# Patient Record
Sex: MALE | Race: WHITE
[De-identification: names, ages, dates, MRNs, and addresses within clinical notes are randomized per-mention and may not be internally consistent; named-entity substitution may affect disease eponyms.]

---

## 2017-02-04 ENCOUNTER — HOSPITAL ENCOUNTER (EMERGENCY)
Dept: HOSPITAL 10 - E/R | Age: 52
LOS: 1 days | Discharge: HOME | End: 2017-02-05
Payer: COMMERCIAL

## 2017-02-04 VITALS
WEIGHT: 162.04 LBS | HEIGHT: 66 IN | BODY MASS INDEX: 26.04 KG/M2 | BODY MASS INDEX: 26.04 KG/M2 | HEIGHT: 66 IN | WEIGHT: 162.04 LBS

## 2017-02-04 DIAGNOSIS — E03.9: ICD-10-CM

## 2017-02-04 DIAGNOSIS — F41.9: Primary | ICD-10-CM

## 2017-02-04 DIAGNOSIS — I10: ICD-10-CM

## 2017-02-04 PROCEDURE — 93005 ELECTROCARDIOGRAM TRACING: CPT

## 2017-02-05 VITALS
SYSTOLIC BLOOD PRESSURE: 133 MMHG | TEMPERATURE: 97.7 F | HEART RATE: 87 BPM | DIASTOLIC BLOOD PRESSURE: 88 MMHG | RESPIRATION RATE: 17 BRPM

## 2017-02-05 NOTE — ERD
ER Documentation


Chief Complaint


Date/Time


DATE: 2/5/17 


TIME: 03:12


Chief Complaint


woke up 30 minutes ago w/ heart palpitation





HPI


This 52-year-old male comes to the emergency room because he woke approximately 

an hour ago and felt heart palpitations.  He also felt like his face was 

flushed and red.  He had eaten dinner and then gone to bed.  He denies any 

chest pain or shortness of breath.  States that his symptoms are completely 

resolved now.  Patient's wife states that he suffered a traumatic event last 

year and since then he has been feeling more stressed out.





ROS


All systems reviewed and are negative except as per history of present illness.





Medications


Home Meds


Active Scripts


Ranitidine Hcl* (Zantac*) 150 Mg Tablet, 150 MG PO BID Y for EPIGASTRIC PAIN, #

30 TAB


   Prov:NENAYANELISNICAGENOVEVA LEY         2/5/17


Reported Medications


[atenolol]   No Conflict Check


   12/5/13


Levothyroxine Sodium* (Levoxyl*) 25 Mcg Tablet, 25 MCG PO DAILY


   12/5/13





Allergies


Allergies:  


Coded Allergies:  


     No Known Allergy (Verified , 12/5/13)





PMhx/Soc


History of Surgery:  Yes (HERNIA REPAIR)


Anesthesia Reaction:  No


Hx Neurological Disorder:  No


Hx Respiratory Disorders:  No


Hx Cardiac Disorders:  Yes (HTN)


Hx Psychiatric Problems:  No


Hx Miscellaneous Medical Probl:  No


Hx Alcohol Use:  No


Hx Substance Use:  No


Hx Tobacco Use:  No





Physical Exam


Vitals





Vital Signs








  Date Time  Temp Pulse Resp B/P Pulse Ox O2 Delivery O2 Flow Rate FiO2


 


2/4/17 23:45 97.7 89 20 150/87 100   








Physical Exam


Const:  []          No distress


Head:   Atraumatic 


Eyes:    Normal Conjunctiva


ENT:    Normal External Ears, Nose and Mouth.


Neck:               Full range of motion..~ No meningismus.


Resp:    Clear to auscultation bilaterally


Cardio:    Regular rate and rhythm, no murmurs


Skin:    No petechiae or rashes


Ext:    No cyanosis, or edema


Neur:    Awake and alert and oriented 3, no focal deficits


Psych:    Normal Mood and Affect


Results 24 hrs





 Current Medications








 Medications


  (Trade)  Dose


 Ordered  Sig/Brenda


 Route


 PRN Reason  Start Time


 Stop Time Status Last Admin


Dose Admin


 


 Alprazolam


  (Xanax)  0.5 mg  ONCE  ONCE


 PO


   2/5/17 03:30


 2/5/17 03:31   


 











Procedures/MDM


Palpitation possibly secondary to anxiety reaction.  Patient was asymptomatic 

prior to examination.  He did never experienced chest pain and has a 

nonischemic EKG.  I am going to give him a small Xanax pills that he can sleep 

through the night.  I am also discharging with instructions to obtain an 

echocardiogram through his primary care doctor which the patient states he has 

good follow-up.  Discharging with Zantac for possible GERD as the patient did 

have symptoms after eating and laying down to sleep.





EKG interpretation: Sinus bradycardia rate of 53, normal axis, no ST or T-wave 

changes concerning for acute ischemia, normal intervals.





Departure


Diagnosis:  


 Primary Impression:  


 Anxiety


 Additional Impression:  


 Palpitations


Condition:  Stable


Patient Instructions:  Anxiety Reaction, Palpitations





Additional Instructions:  


Call your primary care doctor TOMORROW for an appointment during the next 1-2 

days.  Obtain a referral for an ECHOCARDIOGRAM.  See the doctor sooner or 

return here if your condition worsens before your appointment time.











NICA BARRETT DO Feb 5, 2017 03:15

## 2017-09-11 ENCOUNTER — HOSPITAL ENCOUNTER (EMERGENCY)
Dept: HOSPITAL 10 - E/R | Age: 52
LOS: 1 days | Discharge: HOME | End: 2017-09-12
Payer: COMMERCIAL

## 2017-09-11 VITALS
WEIGHT: 155.32 LBS | HEIGHT: 61 IN | BODY MASS INDEX: 29.32 KG/M2 | WEIGHT: 155.32 LBS | HEIGHT: 61 IN | BODY MASS INDEX: 29.32 KG/M2

## 2017-09-11 DIAGNOSIS — K57.92: Primary | ICD-10-CM

## 2017-09-11 DIAGNOSIS — I10: ICD-10-CM

## 2017-09-11 PROCEDURE — 74176 CT ABD & PELVIS W/O CONTRAST: CPT

## 2017-09-11 PROCEDURE — 80053 COMPREHEN METABOLIC PANEL: CPT

## 2017-09-11 PROCEDURE — 83690 ASSAY OF LIPASE: CPT

## 2017-09-11 PROCEDURE — 81003 URINALYSIS AUTO W/O SCOPE: CPT

## 2017-09-11 PROCEDURE — 36415 COLL VENOUS BLD VENIPUNCTURE: CPT

## 2017-09-11 PROCEDURE — 85025 COMPLETE CBC W/AUTO DIFF WBC: CPT

## 2017-09-12 VITALS — SYSTOLIC BLOOD PRESSURE: 161 MMHG | HEART RATE: 61 BPM | DIASTOLIC BLOOD PRESSURE: 91 MMHG | RESPIRATION RATE: 18 BRPM

## 2017-09-12 LAB
ADD UMIC: NO
ALBUMIN SERPL-MCNC: 4.4 G/DL (ref 3.3–4.9)
ALBUMIN/GLOB SERPL: 1.41 {RATIO}
ALP SERPL-CCNC: 97 IU/L (ref 42–121)
ALT SERPL-CCNC: 37 IU/L (ref 13–69)
ANION GAP SERPL CALC-SCNC: 13 MMOL/L (ref 8–16)
AST SERPL-CCNC: 19 IU/L (ref 15–46)
BASOPHILS # BLD AUTO: 0 10^3/UL (ref 0–0.1)
BASOPHILS NFR BLD: 0.1 % (ref 0–2)
BILIRUB DIRECT SERPL-MCNC: 0 MG/DL (ref 0–0.2)
BILIRUB SERPL-MCNC: 0.3 MG/DL (ref 0.2–1.3)
BUN SERPL-MCNC: 11 MG/DL (ref 7–20)
CALCIUM SERPL-MCNC: 9.6 MG/DL (ref 8.4–10.2)
CHLORIDE SERPL-SCNC: 104 MMOL/L (ref 97–110)
CO2 SERPL-SCNC: 30 MMOL/L (ref 21–31)
COLOR UR: (no result)
CREAT SERPL-MCNC: 0.77 MG/DL (ref 0.61–1.24)
EOSINOPHIL # BLD: 0 10^3/UL (ref 0–0.5)
EOSINOPHIL NFR BLD: 0.4 % (ref 0–7)
ERYTHROCYTE [DISTWIDTH] IN BLOOD BY AUTOMATED COUNT: 12.8 % (ref 11.5–14.5)
GLOBULIN SER-MCNC: 3.1 G/DL (ref 1.3–3.2)
GLUCOSE SERPL-MCNC: 92 MG/DL (ref 70–220)
GLUCOSE UR STRIP-MCNC: NEGATIVE MG/DL
HCT VFR BLD CALC: 44.1 % (ref 42–52)
HGB BLD-MCNC: 14.7 G/DL (ref 14–18)
KETONES UR STRIP.AUTO-MCNC: NEGATIVE MG/DL
LYMPHOCYTES # BLD AUTO: 2 10^3/UL (ref 0.8–2.9)
LYMPHOCYTES NFR BLD AUTO: 27.5 % (ref 15–51)
MCH RBC QN AUTO: 28.1 PG (ref 29–33)
MCHC RBC AUTO-ENTMCNC: 33.3 G/DL (ref 32–37)
MCV RBC AUTO: 84.2 FL (ref 82–101)
MONOCYTES # BLD: 0.4 10^3/UL (ref 0.3–0.9)
MONOCYTES NFR BLD: 5.5 % (ref 0–11)
NEUTROPHILS NFR BLD AUTO: 66.1 % (ref 39–77)
NITRITE UR QL STRIP.AUTO: NEGATIVE MG/DL
NRBC # BLD MANUAL: 0 10^3/UL (ref 0–0)
NRBC BLD AUTO-RTO: 0 /100WBC (ref 0–0)
PLATELET # BLD: 219 10^3/UL (ref 140–415)
PMV BLD AUTO: 11.1 FL (ref 7.4–10.4)
POTASSIUM SERPL-SCNC: 4 MMOL/L (ref 3.5–5.1)
PROT SERPL-MCNC: 7.5 G/DL (ref 6.1–8.1)
RBC # BLD AUTO: 5.24 10^6/UL (ref 4.7–6.1)
RBC # UR AUTO: NEGATIVE MG/DL
SODIUM SERPL-SCNC: 143 MMOL/L (ref 135–144)
UR ASCORBIC ACID: NEGATIVE MG/DL
UR BILIRUBIN (DIP): NEGATIVE MG/DL
UR CLARITY: CLEAR
UR PH (DIP): 8 (ref 5–9)
UR SPECIFIC GRAVITY (DIP): 1 (ref 1–1.03)
UR TOTAL PROTEIN (DIP): NEGATIVE MG/DL
UROBILINOGEN UR STRIP-ACNC: NEGATIVE MG/DL
WBC # BLD AUTO: 7.2 10^3/UL (ref 4.8–10.8)
WBC # UR STRIP: NEGATIVE LEU/UL

## 2017-09-12 NOTE — RADRPT
PROCEDURE:   CT of the abdomen and pelvis without contrast

 

CLINICAL INDICATION:   Abdominal pain.  

 

TECHNIQUE:   Spiral CT images through the abdomen and pelvis without the use of oral and without the
 use of intravenous contrast.  The administered radiation dose is CTDI 9.02 and .83. One or m
ore of the following dose reduction techniques were used: automated exposure control, adjustment of 
the mA and/or kV according to patient size, or use of iterative reconstruction technique. 

 

COMPARISON:   None

 

FINDINGS: 

The study is limited by lack of intravenous contrast.

 

Lower thorax: Slight dependent atalectasis of the lung bases is seen.. 

 

Liver: The liver is unremarkable in appearance.   

 

Biliary: The gallbladder is unremarkable.. No biliary ductal dilatation is seen.  

 

Pancreas: Unremarkable. No focal mass or inflammatory process. 

 

Spleen: The spleen is unremarkable in appearance 

 

Adrenal glands: Unremarkable in appearance.  No focal nodule..   

 

Genitourinary: No hydronephrosis or renal calculi are seen.. The bladder is unremarkable in appearan
ce..   

 

Gastrointestinal Tract: There is no evidence for bowel obstruction, free air, or abscess.  The appen
car is unremarkable in appearance.   Moderate rectal stool. Few colonic diverticula. No evidence for
 diverticulitis.  

 

Lymph nodes: No visible pathologic lymphadenopathy..  

 

Vascular structures: The aorta and mesenteric vessels are unremarkable..  

 

Peritoneal cavity: Unremarkable mesentery and peritoneum.. No mass, edema, or ascites.  

 

Reproductive Organs: The prostate is slightly prominent in size, measuring 3.5 x 5.4 cm. Density in 
the right inguinal canal is seen from prior plug repair of hernia. Trace fat is present in the left 
inguinal canal..   

 

Musculoskeletal: There is minimal degenerative change of the spine. 

 

 

IMPRESSION:

No definite acute abnormality of the abdomen or pelvis.  Few colonic diverticula but no evidence for
 diverticulitis.  

 

RPTAT: HLBE

_____________________________________________ 

Physician Ashley           Date    Time 

Electronically viewed and signed by Physician Ashley on 09/12/2017 04:36 

 

D:  09/12/2017 04:36  T:  09/12/2017 04:36

[place]/

## 2017-09-12 NOTE — ERD
ER Documentation


Chief Complaint


Date/Time


DATE: 9/12/17 


TIME: 05:13


Chief Complaint


MID/LOWER AP X3 HRS +NAUSEA, -DIARRHEA,





HPI


This is a 52-year-old male, mid lower abdominal pain for 3 hours with mild 

nausea.  Any diarrhea but has had loose stool over the past 2 days.  No fevers 

no chills.  No other current complaints.  Pain is mild to moderate intensity no 

exacerbating or alleviating events.  There were no change in prandial habits.





ROS


All systems reviewed and are negative except as per history of present illness.





Medications


Home Meds


Reported Medications


Atenolol* (Atenolol*) 25 Mg Tablet, 25 MG PO DAILY, #30 TAB


   9/12/17


Multivitamins* (Theragran*) 1 Tab Tab, 1 TAB PO DAILY, TAB


   9/12/17


Levothyroxine Sodium* (Levoxyl*) 25 Mcg Tablet, 112 MCG PO DAILY


   12/5/13


Discontinued Reported Medications


[atenolol]   No Conflict Check


   12/5/13


Discontinued Scripts


Ranitidine Hcl* (Zantac*) 150 Mg Tablet, 150 MG PO BID Y for EPIGASTRIC PAIN, #

30 TAB


   Prov:NICA BARRETT          2/5/17





Allergies


Allergies:  


Coded Allergies:  


     No Known Allergy (Unverified , 9/12/17)





PMhx/Soc


History of Surgery:  Yes (HERNIA REPAIR)


Anesthesia Reaction:  No


Hx Neurological Disorder:  No


Hx Respiratory Disorders:  No


Hx Cardiac Disorders:  Yes (HTN)


Hx Psychiatric Problems:  No


Hx Miscellaneous Medical Probl:  Yes (thyroid problem)


Hx Alcohol Use:  No


Hx Substance Use:  No


Hx Tobacco Use:  No


Smoking Status:  Never smoker





Physical Exam


Vitals





Vital Signs








  Date Time  Temp Pulse Resp B/P Pulse Ox O2 Delivery O2 Flow Rate FiO2


 


9/12/17 01:29  61 18 161/91 100 Room Air  


 


9/11/17 22:30 97.8 62 18 148/78 99   








Physical Exam


Const:  []


Head:   Atraumatic 


Eyes:    Normal Conjunctiva


ENT:    Normal External Ears, Nose and Mouth.


Neck:               Full range of motion..~ No meningismus.


Resp:    Clear to auscultation bilaterally


Cardio:    Regular rate and rhythm, no murmurs


Abd:    Soft, non tender, non distended. Normal bowel sounds


Skin:    No petechiae or rashes


Back:    No midline or flank tenderness


Ext:    No cyanosis, or edema


Neur:    Awake and alert


Psych:    Normal Mood and Affect


Result Diagram:  


9/12/17 0420 9/12/17 0420





Results 24 hrs





 Laboratory Tests








Test


  9/12/17


04:20


 


White Blood Count 7.210^3/ul 


 


Red Blood Count 5.2410^6/ul 


 


Hemoglobin 14.7g/dl 


 


Hematocrit 44.1% 


 


Mean Corpuscular Volume 84.2fl 


 


Mean Corpuscular Hemoglobin 28.1pg 


 


Mean Corpuscular Hemoglobin


Concent 33.3g/dl 


 


 


Red Cell Distribution Width 12.8% 


 


Platelet Count 48480^3/UL 


 


Mean Platelet Volume 11.1fl 


 


Neutrophils % 66.1% 


 


Lymphocytes % 27.5% 


 


Monocytes % 5.5% 


 


Eosinophils % 0.4% 


 


Basophils % 0.1% 


 


Nucleated Red Blood Cells % 0.0/100WBC 


 


Neutrophils # (Manual) 4.810^3/ul 


 


Lymphocytes # 2.010^3/ul 


 


Monocytes # 0.410^3/ul 


 


Eosinophils # 0.010^3/ul 


 


Basophils # 0.010^3/ul 


 


Nucleated Red Blood Cells # 0.010^3/ul 


 


Sodium Level 143mmol/L 


 


Potassium Level 4.0mmol/L 


 


Chloride Level 104mmol/L 


 


Carbon Dioxide Level 30mmol/L 


 


Anion Gap 13 


 


Blood Urea Nitrogen 11mg/dl 


 


Creatinine 0.77mg/dl 


 


Glucose Level 92mg/dl 


 


Calcium Level 9.6mg/dl 


 


Total Bilirubin 0.3mg/dl 


 


Direct Bilirubin 0.00mg/dl 


 


Indirect Bilirubin 0.3mg/dl 


 


Aspartate Amino Transf


(AST/SGOT) 19IU/L 


 


 


Alanine Aminotransferase


(ALT/SGPT) 37IU/L 


 


 


Alkaline Phosphatase 97IU/L 


 


Total Protein 7.5g/dl 


 


Albumin 4.4g/dl 


 


Globulin 3.10g/dl 


 


Albumin/Globulin Ratio 1.41 


 


Lipase 54U/L 








 Current Medications








 Medications


  (Trade)  Dose


 Ordered  Sig/Brenda


 Route


 PRN Reason  Start Time


 Stop Time Status Last Admin


Dose Admin


 


 Sodium Chloride


  (NS)  1,000 ml @ 


 1,000 mls/hr  Q1H STAT


 IV


   9/12/17 03:57


 9/12/17 04:56 DC 9/12/17 04:09


 


 


 Morphine Sulfate


  (morphine)  4 mg  ONCE  STAT


 IV


   9/12/17 03:57


 9/12/17 04:02 DC  


 


 


 Ondansetron HCl


  (Zofran Inj)  4 mg  ONCE  STAT


 IV


   9/12/17 03:57


 9/12/17 04:02 DC  


 











Procedures/MDM


CBC:      [no e/o of systemic infection or severe anemia]


CMP:      [no e/o severe acidosis, alkalosis, renal failure, diabetic 

ketoacidosis, liver disease]


Lipase:               [no e/o pancreatitis]


PT/INR:   [normal coagulation]


Urine:      [no e/o acute infection or hematuria]





CAT scan of the abdomen pelvis shows diverticuli.





Medical decision making: This is a 52-year-old gentleman as well as really 

diverticulitis.  At this point clinically stable for outpatient management.  

Will be trialed on a trial of Cipro, Flagyl, tramadol, Zofran.  Follow-up in 8 

hours for serial abdominal exams.  Otherwise follow-up PCP.





Departure


Diagnosis:  


 Primary Impression:  


 Diverticulitis


 Diverticulitis site:  unspecified part of intestinal tract  Diverticulitis 

bleeding:  unspecified bleeding status  Diverticulitis complication:  

unspecified complication status  Qualified Code:  K57.92 - Diverticulitis of 

intestine, unspecified bleeding status, unspecified complication status, 

unspecified part of intestinal tract


Condition:  Stable











VANGIE BOWER Sep 12, 2017 05:15

## 2018-03-25 ENCOUNTER — HOSPITAL ENCOUNTER (EMERGENCY)
Dept: HOSPITAL 91 - FTE | Age: 53
Discharge: HOME | End: 2018-03-25
Payer: COMMERCIAL

## 2018-03-25 ENCOUNTER — HOSPITAL ENCOUNTER (EMERGENCY)
Age: 53
Discharge: HOME | End: 2018-03-25

## 2018-03-25 DIAGNOSIS — I10: ICD-10-CM

## 2018-03-25 DIAGNOSIS — R42: Primary | ICD-10-CM

## 2018-03-25 LAB
ADD MAN DIFF?: NO
ALANINE AMINOTRANSFERASE: 40 IU/L (ref 13–69)
ALBUMIN/GLOBULIN RATIO: 1.85
ALBUMIN: 5 G/DL (ref 3.3–4.9)
ALKALINE PHOSPHATASE: 98 IU/L (ref 42–121)
ANION GAP: 19 (ref 8–16)
ASPARTATE AMINO TRANSFERASE: 29 IU/L (ref 15–46)
BASOPHIL #: 0 10^3/UL (ref 0–0.1)
BASOPHILS %: 0.8 % (ref 0–2)
BILIRUBIN,DIRECT: 0 MG/DL (ref 0–0.2)
BILIRUBIN,TOTAL: 0.5 MG/DL (ref 0.2–1.3)
BLOOD UREA NITROGEN: 13 MG/DL (ref 7–20)
CALCIUM: 9.7 MG/DL (ref 8.4–10.2)
CARBON DIOXIDE: 26 MMOL/L (ref 21–31)
CHLORIDE: 106 MMOL/L (ref 97–110)
CREATININE: 0.87 MG/DL (ref 0.61–1.24)
EOSINOPHILS #: 0.1 10^3/UL (ref 0–0.5)
EOSINOPHILS %: 1.6 % (ref 0–7)
FREE THYROXINE INDEX (CALC): 3.53 UG/ML (ref 0.65–3.89)
GLOBULIN: 2.7 G/DL (ref 1.3–3.2)
GLUCOSE: 110 MG/DL (ref 70–220)
HEMATOCRIT: 45 % (ref 42–52)
HEMOGLOBIN: 15.2 G/DL (ref 14–18)
LYMPHOCYTES #: 1.4 10^3/UL (ref 0.8–2.9)
LYMPHOCYTES %: 36.1 % (ref 15–51)
MEAN CORPUSCULAR HEMOGLOBIN: 28 PG (ref 29–33)
MEAN CORPUSCULAR HGB CONC: 33.8 G/DL (ref 32–37)
MEAN CORPUSCULAR VOLUME: 83 FL (ref 82–101)
MEAN PLATELET VOLUME: 11.2 FL (ref 7.4–10.4)
MONOCYTE #: 0.2 10^3/UL (ref 0.3–0.9)
MONOCYTES %: 4.8 % (ref 0–11)
NEUTROPHIL #: 2.1 10^3/UL (ref 1.6–7.5)
NEUTROPHILS %: 56.4 % (ref 39–77)
NUCLEATED RED BLOOD CELLS #: 0 10^3/UL (ref 0–0)
NUCLEATED RED BLOOD CELLS%: 0 /100WBC (ref 0–0)
PLATELET COUNT: 182 10^3/UL (ref 140–415)
POTASSIUM: 4.6 MMOL/L (ref 3.5–5.1)
RED BLOOD COUNT: 5.42 10^6/UL (ref 4.7–6.1)
RED CELL DISTRIBUTION WIDTH: 13.5 % (ref 11.5–14.5)
SODIUM: 146 MMOL/L (ref 135–144)
T3 UPTAKE: 31 % (ref 23.5–40.5)
T4 (THYROXINE): 11.4 UG/DL (ref 5.5–11)
THYROID STIMULATING HORMONE: 0.4 MIU/L (ref 0.47–4.68)
TOTAL PROTEIN: 7.7 G/DL (ref 6.1–8.1)
WHITE BLOOD COUNT: 3.7 10^3/UL (ref 4.8–10.8)

## 2018-03-25 PROCEDURE — 80053 COMPREHEN METABOLIC PANEL: CPT

## 2018-03-25 PROCEDURE — 36415 COLL VENOUS BLD VENIPUNCTURE: CPT

## 2018-03-25 PROCEDURE — 84443 ASSAY THYROID STIM HORMONE: CPT

## 2018-03-25 PROCEDURE — 99284 EMERGENCY DEPT VISIT MOD MDM: CPT

## 2018-03-25 PROCEDURE — 93005 ELECTROCARDIOGRAM TRACING: CPT

## 2018-03-25 PROCEDURE — 85025 COMPLETE CBC W/AUTO DIFF WBC: CPT

## 2018-03-25 PROCEDURE — 84479 ASSAY OF THYROID (T3 OR T4): CPT

## 2018-03-25 PROCEDURE — 84436 ASSAY OF TOTAL THYROXINE: CPT

## 2018-06-27 ENCOUNTER — HOSPITAL ENCOUNTER (EMERGENCY)
Age: 53
Discharge: HOME | End: 2018-06-27

## 2018-06-27 ENCOUNTER — HOSPITAL ENCOUNTER (EMERGENCY)
Dept: HOSPITAL 91 - FTE | Age: 53
Discharge: HOME | End: 2018-06-27
Payer: COMMERCIAL

## 2018-06-27 DIAGNOSIS — M54.31: Primary | ICD-10-CM

## 2018-06-27 DIAGNOSIS — I10: ICD-10-CM

## 2018-06-27 PROCEDURE — 99283 EMERGENCY DEPT VISIT LOW MDM: CPT
